# Patient Record
Sex: FEMALE | Race: WHITE | NOT HISPANIC OR LATINO | Employment: FULL TIME | URBAN - METROPOLITAN AREA
[De-identification: names, ages, dates, MRNs, and addresses within clinical notes are randomized per-mention and may not be internally consistent; named-entity substitution may affect disease eponyms.]

---

## 2017-07-28 ENCOUNTER — PATIENT MESSAGE (OUTPATIENT)
Dept: OBSTETRICS AND GYNECOLOGY | Facility: CLINIC | Age: 42
End: 2017-07-28

## 2021-01-10 ENCOUNTER — OFFICE VISIT (OUTPATIENT)
Dept: URGENT CARE | Facility: CLINIC | Age: 46
End: 2021-01-10
Payer: COMMERCIAL

## 2021-01-10 VITALS
TEMPERATURE: 98 F | OXYGEN SATURATION: 99 % | SYSTOLIC BLOOD PRESSURE: 153 MMHG | HEIGHT: 62 IN | DIASTOLIC BLOOD PRESSURE: 101 MMHG | BODY MASS INDEX: 34.96 KG/M2 | HEART RATE: 105 BPM | WEIGHT: 190 LBS

## 2021-01-10 DIAGNOSIS — U07.1 COVID-19 VIRUS DETECTED: ICD-10-CM

## 2021-01-10 DIAGNOSIS — U07.1 COVID-19: Primary | ICD-10-CM

## 2021-01-10 DIAGNOSIS — R09.81 SINUS CONGESTION: ICD-10-CM

## 2021-01-10 LAB
CTP QC/QA: YES
SARS-COV-2 RDRP RESP QL NAA+PROBE: POSITIVE

## 2021-01-10 PROCEDURE — 99203 OFFICE O/P NEW LOW 30 MIN: CPT | Mod: S$GLB,,, | Performed by: PHYSICIAN ASSISTANT

## 2021-01-10 PROCEDURE — 99203 PR OFFICE/OUTPT VISIT, NEW, LEVL III, 30-44 MIN: ICD-10-PCS | Mod: S$GLB,,, | Performed by: PHYSICIAN ASSISTANT

## 2021-01-10 PROCEDURE — 3008F PR BODY MASS INDEX (BMI) DOCUMENTED: ICD-10-PCS | Mod: CPTII,S$GLB,, | Performed by: PHYSICIAN ASSISTANT

## 2021-01-10 PROCEDURE — U0002 COVID-19 LAB TEST NON-CDC: HCPCS | Mod: QW,S$GLB,, | Performed by: PHYSICIAN ASSISTANT

## 2021-01-10 PROCEDURE — 3008F BODY MASS INDEX DOCD: CPT | Mod: CPTII,S$GLB,, | Performed by: PHYSICIAN ASSISTANT

## 2021-01-10 PROCEDURE — U0002: ICD-10-PCS | Mod: QW,S$GLB,, | Performed by: PHYSICIAN ASSISTANT

## 2021-01-12 ENCOUNTER — NURSE TRIAGE (OUTPATIENT)
Dept: ADMINISTRATIVE | Facility: CLINIC | Age: 46
End: 2021-01-12

## 2021-01-12 RX ORDER — CLONIDINE HYDROCHLORIDE 0.1 MG/1
TABLET ORAL
Qty: 20 TABLET | Refills: 0 | Status: SHIPPED | OUTPATIENT
Start: 2021-01-12

## 2021-01-13 ENCOUNTER — NURSE TRIAGE (OUTPATIENT)
Dept: ADMINISTRATIVE | Facility: CLINIC | Age: 46
End: 2021-01-13

## 2021-01-14 ENCOUNTER — NURSE TRIAGE (OUTPATIENT)
Dept: ADMINISTRATIVE | Facility: CLINIC | Age: 46
End: 2021-01-14

## 2021-01-18 PROBLEM — U07.1 COVID-19 VIRUS INFECTION: Status: ACTIVE | Noted: 2021-01-18

## 2021-05-06 ENCOUNTER — PATIENT MESSAGE (OUTPATIENT)
Dept: RESEARCH | Facility: HOSPITAL | Age: 46
End: 2021-05-06

## 2021-12-17 PROBLEM — N20.0 KIDNEY STONES: Status: ACTIVE | Noted: 2021-12-17

## 2023-04-21 ENCOUNTER — OCCUPATIONAL HEALTH (OUTPATIENT)
Dept: URGENT CARE | Facility: CLINIC | Age: 48
End: 2023-04-21

## 2023-04-21 DIAGNOSIS — Z02.83 ENCOUNTER FOR DRUG SCREENING: Primary | ICD-10-CM

## 2023-04-21 LAB
CTP QC/QA: YES
POC 10 PANEL DRUG SCREEN: NEGATIVE

## 2023-04-21 PROCEDURE — 80305 POCT RAPID DRUG SCREEN 10 PANEL: ICD-10-PCS | Mod: S$GLB,,, | Performed by: FAMILY MEDICINE

## 2023-04-21 PROCEDURE — 80305 DRUG TEST PRSMV DIR OPT OBS: CPT | Mod: S$GLB,,, | Performed by: FAMILY MEDICINE

## 2023-06-19 ENCOUNTER — TELEPHONE (OUTPATIENT)
Dept: OBSTETRICS AND GYNECOLOGY | Facility: CLINIC | Age: 48
End: 2023-06-19
Payer: COMMERCIAL

## 2023-06-19 NOTE — TELEPHONE ENCOUNTER
----- Message from Ana Cristina Antoniomaria elenagoyo sent at 6/19/2023  1:03 PM CDT -----  Regarding: est care - np  Contact: patient  Type:  Sooner Appointment Request    Caller is requesting a sooner appointment.  Caller declined first available appointment listed below.  Caller will not accept being placed on the waitlist and is requesting a message be sent to doctor.    Name of Caller:  Patient  When is the first available appointment?    Symptoms: needs a  hormones check   Best Call Back Number:  [phone] (home)     Additional Information:  Please call to schedule thanks!

## 2023-08-16 ENCOUNTER — LAB VISIT (OUTPATIENT)
Dept: LAB | Facility: HOSPITAL | Age: 48
End: 2023-08-16
Attending: OBSTETRICS & GYNECOLOGY
Payer: COMMERCIAL

## 2023-08-16 ENCOUNTER — OFFICE VISIT (OUTPATIENT)
Dept: OBSTETRICS AND GYNECOLOGY | Facility: CLINIC | Age: 48
End: 2023-08-16
Payer: COMMERCIAL

## 2023-08-16 VITALS
DIASTOLIC BLOOD PRESSURE: 82 MMHG | BODY MASS INDEX: 38.83 KG/M2 | SYSTOLIC BLOOD PRESSURE: 136 MMHG | HEIGHT: 61 IN | WEIGHT: 205.69 LBS

## 2023-08-16 DIAGNOSIS — Z90.710 S/P HYSTERECTOMY: ICD-10-CM

## 2023-08-16 DIAGNOSIS — R23.2 HOT FLASHES: ICD-10-CM

## 2023-08-16 DIAGNOSIS — Z12.31 VISIT FOR SCREENING MAMMOGRAM: ICD-10-CM

## 2023-08-16 DIAGNOSIS — Z01.419 ROUTINE GYNECOLOGICAL EXAMINATION: Primary | ICD-10-CM

## 2023-08-16 LAB
ESTRADIOL SERPL-MCNC: 81 PG/ML
FSH SERPL-ACNC: 7.89 MIU/ML

## 2023-08-16 PROCEDURE — 1159F PR MEDICATION LIST DOCUMENTED IN MEDICAL RECORD: ICD-10-PCS | Mod: CPTII,S$GLB,, | Performed by: OBSTETRICS & GYNECOLOGY

## 2023-08-16 PROCEDURE — 83001 ASSAY OF GONADOTROPIN (FSH): CPT | Performed by: OBSTETRICS & GYNECOLOGY

## 2023-08-16 PROCEDURE — 82670 ASSAY OF TOTAL ESTRADIOL: CPT | Performed by: OBSTETRICS & GYNECOLOGY

## 2023-08-16 PROCEDURE — 3075F PR MOST RECENT SYSTOLIC BLOOD PRESS GE 130-139MM HG: ICD-10-PCS | Mod: CPTII,S$GLB,, | Performed by: OBSTETRICS & GYNECOLOGY

## 2023-08-16 PROCEDURE — 1159F MED LIST DOCD IN RCRD: CPT | Mod: CPTII,S$GLB,, | Performed by: OBSTETRICS & GYNECOLOGY

## 2023-08-16 PROCEDURE — 99999 PR PBB SHADOW E&M-EST. PATIENT-LVL III: ICD-10-PCS | Mod: PBBFAC,,, | Performed by: OBSTETRICS & GYNECOLOGY

## 2023-08-16 PROCEDURE — 99999 PR PBB SHADOW E&M-EST. PATIENT-LVL III: CPT | Mod: PBBFAC,,, | Performed by: OBSTETRICS & GYNECOLOGY

## 2023-08-16 PROCEDURE — 3075F SYST BP GE 130 - 139MM HG: CPT | Mod: CPTII,S$GLB,, | Performed by: OBSTETRICS & GYNECOLOGY

## 2023-08-16 PROCEDURE — 4010F PR ACE/ARB THEARPY RXD/TAKEN: ICD-10-PCS | Mod: CPTII,S$GLB,, | Performed by: OBSTETRICS & GYNECOLOGY

## 2023-08-16 PROCEDURE — 99396 PR PREVENTIVE VISIT,EST,40-64: ICD-10-PCS | Mod: S$GLB,,, | Performed by: OBSTETRICS & GYNECOLOGY

## 2023-08-16 PROCEDURE — 4010F ACE/ARB THERAPY RXD/TAKEN: CPT | Mod: CPTII,S$GLB,, | Performed by: OBSTETRICS & GYNECOLOGY

## 2023-08-16 PROCEDURE — 3079F PR MOST RECENT DIASTOLIC BLOOD PRESSURE 80-89 MM HG: ICD-10-PCS | Mod: CPTII,S$GLB,, | Performed by: OBSTETRICS & GYNECOLOGY

## 2023-08-16 PROCEDURE — 3008F BODY MASS INDEX DOCD: CPT | Mod: CPTII,S$GLB,, | Performed by: OBSTETRICS & GYNECOLOGY

## 2023-08-16 PROCEDURE — 36415 COLL VENOUS BLD VENIPUNCTURE: CPT | Mod: PN | Performed by: OBSTETRICS & GYNECOLOGY

## 2023-08-16 PROCEDURE — 3008F PR BODY MASS INDEX (BMI) DOCUMENTED: ICD-10-PCS | Mod: CPTII,S$GLB,, | Performed by: OBSTETRICS & GYNECOLOGY

## 2023-08-16 PROCEDURE — 99396 PREV VISIT EST AGE 40-64: CPT | Mod: S$GLB,,, | Performed by: OBSTETRICS & GYNECOLOGY

## 2023-08-16 PROCEDURE — 3079F DIAST BP 80-89 MM HG: CPT | Mod: CPTII,S$GLB,, | Performed by: OBSTETRICS & GYNECOLOGY

## 2023-08-16 NOTE — PROGRESS NOTES
Chief Complaint   Patient presents with    Well Woman     History of Present Illness: Radha De Leon is a 47 y.o. female that presents today 2023 for well gyn visit.    Past Medical History:   Diagnosis Date    General anesthetics causing adverse effect in therapeutic use     was alert during hyst and had to be remedicated    HTN (hypertension)     Kidney stone     Uterine leiomyoma        Past Surgical History:   Procedure Laterality Date    BELT ABDOMINOPLASTY  2016     SECTION      ENDOMETRIAL ABLATION      essure  2011    EXTRACORPOREAL SHOCK WAVE LITHOTRIPSY Right 2021    Procedure: LITHOTRIPSY, ESWL;  Surgeon: Angelo Soto MD;  Location: [facility];  Service: Urology;  Laterality: Right;    FINGER SURGERY      HYSTERECTOMY  2016    ovaries remain, benign       Outpatient Medications Prior to Visit   Medication Sig Dispense Refill    ascorbic acid, vitamin C, (VITAMIN C) 500 MG tablet Take 1 tablet (500 mg total) by mouth 2 (two) times daily.      cyclobenzaprine (FLEXERIL) 10 MG tablet Take 10 mg by mouth daily as needed (jaw tightness/TMJ).   1    fluticasone propionate (FLONASE) 50 mcg/actuation nasal spray 1 spray by Each Nostril route daily as needed for Rhinitis or Allergies.      losartan (COZAAR) 50 MG tablet Take 50 mg by mouth once daily.      cloNIDine (CATAPRES) 0.1 MG tablet Take one tablet twice daily PRN for BP > 145/90 (Patient not taking: Reported on 2021) 20 tablet 0    ketorolac (TORADOL) 10 mg tablet Take 1 tablet (10 mg total) by mouth every 6 (six) hours as needed for Pain. 20 tablet 0     No facility-administered medications prior to visit.       Review of patient's allergies indicates:   Allergen Reactions    Hydrocodone Hives       Family History   Problem Relation Age of Onset    Heart disease Mother     Hypertension Mother     Breast cancer Neg Hx        Social History     Socioeconomic History    Marital status:    Tobacco Use    Smoking  "status: Never    Smokeless tobacco: Never   Substance and Sexual Activity    Alcohol use: Yes     Comment: once per week    Drug use: No    Sexual activity: Yes     Partners: Male     Birth control/protection: Surgical       OB History    Para Term  AB Living   2 2 2     2   SAB IAB Ectopic Multiple Live Births           2      # Outcome Date GA Lbr Damir/2nd Weight Sex Delivery Anes PTL Lv   2 Term  40w0d  3.685 kg (8 lb 2 oz) M CS-LTranv EPI  IGOR      Birth Comments: nocomplications   1 Term  40w0d  3.742 kg (8 lb 4 oz) F CS-LTranv EPI  IGOR      Birth Comments: no complications       Review of Symptoms:  GENERAL: Denies weight gain or weight loss. Feeling well overall.   SKIN: Denies rash or lesions.   HEAD: Denies head injury or headache.   NODES: Denies enlarged lymph nodes.   CHEST: Denies chest pain or shortness of breath.   CARDIOVASCULAR: Denies palpitations or left sided chest pain.   ABDOMEN: No abdominal pain, constipation, diarrhea, nausea, vomiting or rectal bleeding.   URINARY: No frequency, dysuria, hematuria, or burning on urination.  HEMATOLOGIC: No easy bruisability or excessive bleeding.   MUSCULOSKELETAL: Denies joint pain or swelling.     /82   Ht 5' 1" (1.549 m)   Wt 93.3 kg (205 lb 11 oz)   LMP 2016 (Approximate)   Physical Exam:  APPEARANCE: Well nourished, well developed, in no acute distress.  SKIN: Normal skin turgor, no lesions.  NECK: Neck symmetric without masses   RESPIRATORY: Normal respiratory effort with no retractions or use of accessory muscles  CARDIOVASCULAR: Peripheral vascular system with no swelling no varicosities and palpation of pulses normal  LYMPHATIC: No enlargements of the lymph nodes noted in the neck, axillae, or groin  ABDOMEN: Soft. No tenderness or masses. No hepatosplenomegaly. No hernias.  BREASTS: Symmetrical, no skin changes or visible lesions. No palpable masses, nipple discharge or adenopathy bilaterally.  PELVIC: Normal " external female genitalia without lesions. Normal hair distribution. Adequate perineal body, normal urethral meatus. Urethra with no masses.  Bladder nontender. Vagina moist and well rugated without lesions or discharge. No significant cystocele or rectocele.  Adnexa without masses or tenderness. Urethra and bladder normal.   EXTREMITIES: No clubbing cyanosis or edema.    ASSESSMENT/PLAN:  Routine gynecological examination    Visit for screening mammogram  -     Mammo Digital Screening Bilat w/ Yaakov; Future; Expected date: 08/16/2023    Hot flashes  -     FOLLICLE STIMULATING HORMONE; Future; Expected date: 08/16/2023  -     Estradiol; Future; Expected date: 08/16/2023    S/P hysterectomy          Patient was counseled today on Pap guidelines. We discussed the discontinuing the pap smear after hysterectomy except in certain high risk cases.  We discussed the need for pelvic exams.   We discussed STD screening if at high risk for an STD.  We discussed breast cancer screening with mammograms every other year after the age of 40 and annually after the age of 50.    We discussed colon cancer screening.   Osteoporosis screening with the Dexa Bone Scan discussed when indicated.   She will see her PCP for other health maintenance.       FOLLOW-UP:prn

## 2024-11-19 ENCOUNTER — TELEPHONE (OUTPATIENT)
Dept: OTOLARYNGOLOGY | Facility: CLINIC | Age: 49
End: 2024-11-19
Payer: COMMERCIAL

## 2024-11-19 ENCOUNTER — OFFICE VISIT (OUTPATIENT)
Dept: OTOLARYNGOLOGY | Facility: CLINIC | Age: 49
End: 2024-11-19
Payer: COMMERCIAL

## 2024-11-19 VITALS — WEIGHT: 212.75 LBS | BODY MASS INDEX: 40.17 KG/M2 | HEIGHT: 61 IN

## 2024-11-19 DIAGNOSIS — K11.20 PAROTITIS: Primary | ICD-10-CM

## 2024-11-19 DIAGNOSIS — H92.03 OTALGIA OF BOTH EARS: ICD-10-CM

## 2024-11-19 PROCEDURE — 99204 OFFICE O/P NEW MOD 45 MIN: CPT | Mod: S$GLB,,, | Performed by: STUDENT IN AN ORGANIZED HEALTH CARE EDUCATION/TRAINING PROGRAM

## 2024-11-19 PROCEDURE — 1159F MED LIST DOCD IN RCRD: CPT | Mod: CPTII,S$GLB,, | Performed by: STUDENT IN AN ORGANIZED HEALTH CARE EDUCATION/TRAINING PROGRAM

## 2024-11-19 PROCEDURE — 3008F BODY MASS INDEX DOCD: CPT | Mod: CPTII,S$GLB,, | Performed by: STUDENT IN AN ORGANIZED HEALTH CARE EDUCATION/TRAINING PROGRAM

## 2024-11-19 PROCEDURE — 99999 PR PBB SHADOW E&M-EST. PATIENT-LVL III: CPT | Mod: PBBFAC,,, | Performed by: STUDENT IN AN ORGANIZED HEALTH CARE EDUCATION/TRAINING PROGRAM

## 2024-11-19 PROCEDURE — 4010F ACE/ARB THERAPY RXD/TAKEN: CPT | Mod: CPTII,S$GLB,, | Performed by: STUDENT IN AN ORGANIZED HEALTH CARE EDUCATION/TRAINING PROGRAM

## 2024-11-19 RX ORDER — AMOXICILLIN AND CLAVULANATE POTASSIUM 875; 125 MG/1; MG/1
1 TABLET, FILM COATED ORAL EVERY 12 HOURS
Qty: 20 TABLET | Refills: 0 | Status: SHIPPED | OUTPATIENT
Start: 2024-11-19 | End: 2024-11-29

## 2024-11-19 RX ORDER — PREDNISONE 20 MG/1
20 TABLET ORAL DAILY
Qty: 10 TABLET | Refills: 0 | Status: SHIPPED | OUTPATIENT
Start: 2024-11-19 | End: 2024-11-29

## 2024-11-19 RX ORDER — AZELASTINE 1 MG/ML
SPRAY, METERED NASAL
COMMUNITY

## 2024-11-19 NOTE — TELEPHONE ENCOUNTER
----- Message from Vimal sent at 11/19/2024  8:30 AM CST -----  Contact: Self  Type:  Sooner Appointment Request    Caller is requesting a sooner appointment.  Caller declined first available appointment listed below.  Caller will not accept being placed on the waitlist and is requesting a message be sent to doctor.    Name of Caller:  Patient  When is the first available appointment?  Next Tuesday  Symptoms:  Possible ear infection/Swollen glands  Would the patient rather a call back or a response via [facility]? Call Back  Best Call Back Number:  [phone]  Additional Information:  Patient is requesting to be seen today

## 2024-11-19 NOTE — PROGRESS NOTES
Otolaryngology Clinic Note    Subjective:       Patient ID: Radha De Leon is a 49 y.o. female.    Chief Complaint: Otalgia and Neck Swelling      History of Present Illness: Radha De Leon is a 49 y.o. female presenting with left ear pain and neck feels swollen. Right ear somewhat hurts. Started last Wednesday. Started itching at first. Sleeps with nightguard. Was not sure if teeth grinding was worse. Tried warm compressed and motrin. Nose feels fine. Throat feels fine. Works with kids. Feels swollen under ear. No fevers. Avoiding hard things eating as it hurts. Tried muscle relaxants but did not help. Did abx drops to left ear and it did not help. Had ciprodex.       Past Surgical History:   Procedure Laterality Date    BELT ABDOMINOPLASTY  2016     SECTION      ENDOMETRIAL ABLATION      essure  2011    EXTRACORPOREAL SHOCK WAVE LITHOTRIPSY Right 2021    Procedure: LITHOTRIPSY, ESWL;  Surgeon: Angelo Soto MD;  Location: [facility];  Service: Urology;  Laterality: Right;    FINGER SURGERY      HYSTERECTOMY  2016    ovaries remain, benign     Past Medical History:   Diagnosis Date    General anesthetics causing adverse effect in therapeutic use     was alert during hyst and had to be remedicated    HTN (hypertension)     Kidney stone     Uterine leiomyoma      Social Drivers of Health     Tobacco Use: Low Risk  (2023)    Patient History     Smoking Tobacco Use: Never     Smokeless Tobacco Use: Never     Passive Exposure: Not on file   Alcohol Use: Not At Risk (2024)    AUDIT-C     Frequency of Alcohol Consumption: 2-4 times a month     Average Number of Drinks: 1 or 2     Frequency of Binge Drinking: Never   Financial Resource Strain: Low Risk  (2024)    Overall Financial Resource Strain (CARDIA)     Difficulty of Paying Living Expenses: Not hard at all   Food Insecurity: No Food Insecurity (2024)    Hunger Vital Sign     Worried About Running Out of Food in the  Last Year: Never true     Ran Out of Food in the Last Year: Never true   Transportation Needs: Not on file   Physical Activity: Insufficiently Active (11/19/2024)    Exercise Vital Sign     Days of Exercise per Week: 2 days     Minutes of Exercise per Session: 30 min   Stress: Stress Concern Present (11/19/2024)    [employer] [employer] [place] [employer] - Occupational Stress Questionnaire     Feeling of Stress : To some extent   Housing Stability: Unknown (11/19/2024)    Housing Stability Vital Sign     Unable to Pay for Housing in the Last Year: No     Homeless in the Last Year: Not on file   Depression: Not on file   Utilities: Not At Risk (11/19/2024)    [facility] Utilities     Threatened with loss of utilities: No   Health Literacy: Adequate Health Literacy (11/19/2024)     Health Literacy     Frequency of need for help with medical instructions: Never   Social Isolation: Not on file     Review of patient's allergies indicates:   Allergen Reactions    Hydrocodone Hives     Current Outpatient Medications   Medication Instructions    ascorbic acid (vitamin C) (VITAMIN C) 500 mg, Oral, 2 times daily    azelastine (ASTELIN) 137 mcg (0.1 %) nasal spray USE 2 SPRAYS IN EACH NOSTRIL TWICE DAILY for 25    cloNIDine (CATAPRES) 0.1 MG tablet Take one tablet twice daily PRN for BP > 145/90    cyclobenzaprine (FLEXERIL) 10 mg, Daily PRN    fluticasone propionate (FLONASE) 50 mcg/actuation nasal spray 1 spray, Daily PRN    ketorolac (TORADOL) 10 mg, Oral, Every 6 hours PRN    losartan (COZAAR) 50 mg, Daily         ENT ROS negative except as stated above.     Patient answers are not available for this visit.            Objective:      There were no vitals filed for this visit.    General: NAD, well appearing  Eyes: Normal conjunctiva and lids  Face: symmetric, nerve intact  Nose: The nose is without any evidence of any deformity. The nasal mucosa is moist. The septum is midline. There is no evidence of septal hematoma. The  turbinates are without abnormality.   Ears: The ears are with normal-appearing pinna. Examination of the canals is normal appearing bilaterally. Mild wet wax on left but she has been using drops. There is no drainage or erythema noted. The tympanic membranes are normal appearing with pearly color, normal-appearing landmarks and normal light reflex. Hearing is grossly intact. Pain at Tmj area but over entire parotid.   Mouth: No obvious abnormalities to the lips. The teeth are unremarkable. The gingivae are without any obvious evidence of infection or lesion. The oral mucosa is moist and pink. There are no obvious masses to the hard or soft palate. Normal saliva from parotid duct. No stones.   Oropharynx: The uvula is midline.  The tongue is midline. The posterior pharynx is without erythema or exudate.   Salivary glands: Bilateral parotid gland swelling, soft, no stones. Not indurated., no masses  Neck: No lymphadenopathy, trachea midline, thryoid not enlarged.  Psych: Normal mood and affect.   Neuro: Grossly intact  Speech: fluent       Assessment and Plan:       1. Parotitis    2. Otalgia of both ears          Suspect viral parotitis at this point but will cover bacterial infection as well.   Will do prednisone 20 mg x 10 days and augmentin x 10 days. Recommend sialogogues.   Warm compresses are ok.     RTC: contact if not improving    Plan of care was discussed in detail with the patient, who agreed with the plan as above. All questions were answered in detail.     Sheila Owens MD  Otolaryngology